# Patient Record
Sex: FEMALE | Race: WHITE | NOT HISPANIC OR LATINO | Employment: OTHER | URBAN - METROPOLITAN AREA
[De-identification: names, ages, dates, MRNs, and addresses within clinical notes are randomized per-mention and may not be internally consistent; named-entity substitution may affect disease eponyms.]

---

## 2017-01-13 DIAGNOSIS — F98.8 ADD (ATTENTION DEFICIT DISORDER): ICD-10-CM

## 2017-01-13 DIAGNOSIS — E78.5 HYPERLIPIDEMIA, UNSPECIFIED HYPERLIPIDEMIA TYPE: ICD-10-CM

## 2017-01-13 RX ORDER — ATORVASTATIN CALCIUM 10 MG/1
10 TABLET, FILM COATED ORAL DAILY
Qty: 30 TABLET | Refills: 2 | Status: SHIPPED | OUTPATIENT
Start: 2017-01-13 | End: 2017-05-30 | Stop reason: SDUPTHER

## 2017-01-13 RX ORDER — ESCITALOPRAM OXALATE 10 MG/1
10 TABLET ORAL DAILY
Qty: 30 TABLET | Refills: 2 | Status: SHIPPED | OUTPATIENT
Start: 2017-01-13 | End: 2017-05-30 | Stop reason: SDUPTHER

## 2017-01-13 RX ORDER — LISDEXAMFETAMINE DIMESYLATE CAPSULES 20 MG/1
20 CAPSULE ORAL EVERY MORNING
Qty: 30 CAPSULE | Refills: 0 | Status: SHIPPED | OUTPATIENT
Start: 2017-01-13 | End: 2017-02-14 | Stop reason: SDUPTHER

## 2017-01-13 NOTE — TELEPHONE ENCOUNTER
----- Message from Yulisa Chiang sent at 2017 12:28 PM CST -----  Contact: SELF  Renetta East  MRN: [id number]  : 1965  PCP: Nathen Arreola  Home Phone      [phone]  Work Phone      Not on file.  Mobile          Not on file.      MESSAGE: PT WAS SUPPOSED TO SEE DR GODFREY TODAY FOR 3 MO CHECKUP AND REFILLS. DR GODFREY HAD TO LEAVE AND SHE WILL NEED MEDS CALLED IN IF AT ALL POSSIBLE. NEEDS VYVANSE, LIPITOR, AND LEXAPRO    Phone: [phone]    PHARMACY:[facility]

## 2017-02-01 ENCOUNTER — TELEPHONE (OUTPATIENT)
Dept: FAMILY MEDICINE | Facility: CLINIC | Age: 52
End: 2017-02-01

## 2017-02-01 NOTE — TELEPHONE ENCOUNTER
Attempted to do PA for Vyvanse on covermymeds. Patient inactive. Attempted to call pt to find out what insurance she has. No answer,FAMILIA    Key: [id number]

## 2017-02-08 ENCOUNTER — TELEPHONE (OUTPATIENT)
Dept: FAMILY MEDICINE | Facility: CLINIC | Age: 52
End: 2017-02-08

## 2017-02-10 ENCOUNTER — TELEPHONE (OUTPATIENT)
Dept: FAMILY MEDICINE | Facility: CLINIC | Age: 52
End: 2017-02-10

## 2017-02-10 NOTE — TELEPHONE ENCOUNTER
----- Message from Gill Beltran sent at 2/10/2017  4:28 PM CST -----  Contact: self  Renetta East  MRN: [id number]  : 1965  PCP: Nathen Arreola  Home Phone      [phone]  Work Phone      Not on file.  Mobile          Not on file.      MESSAGE:    New pharmacy # for pa is [id number] through cover my meds for vyvanse 20 mg once a day    Phone:   [phone]    Pharmacy:   [facility] in [place]

## 2017-02-14 ENCOUNTER — OFFICE VISIT (OUTPATIENT)
Dept: FAMILY MEDICINE | Facility: CLINIC | Age: 52
End: 2017-02-14
Payer: COMMERCIAL

## 2017-02-14 ENCOUNTER — TELEPHONE (OUTPATIENT)
Dept: FAMILY MEDICINE | Facility: CLINIC | Age: 52
End: 2017-02-14

## 2017-02-14 VITALS
BODY MASS INDEX: 27.3 KG/M2 | WEIGHT: 148.38 LBS | HEIGHT: 62 IN | HEART RATE: 88 BPM | DIASTOLIC BLOOD PRESSURE: 72 MMHG | SYSTOLIC BLOOD PRESSURE: 128 MMHG

## 2017-02-14 DIAGNOSIS — F98.8 ADD (ATTENTION DEFICIT DISORDER): ICD-10-CM

## 2017-02-14 PROCEDURE — 99214 OFFICE O/P EST MOD 30 MIN: CPT | Mod: S$GLB,,, | Performed by: FAMILY MEDICINE

## 2017-02-14 PROCEDURE — 99999 PR PBB SHADOW E&M-EST. PATIENT-LVL II: CPT | Mod: PBBFAC,,, | Performed by: FAMILY MEDICINE

## 2017-02-14 RX ORDER — LISDEXAMFETAMINE DIMESYLATE CAPSULES 20 MG/1
20 CAPSULE ORAL EVERY MORNING
Qty: 30 CAPSULE | Refills: 0 | Status: SHIPPED | OUTPATIENT
Start: 2017-02-14 | End: 2017-05-30 | Stop reason: DRUGHIGH

## 2017-02-14 RX ORDER — LISDEXAMFETAMINE DIMESYLATE CAPSULES 20 MG/1
20 CAPSULE ORAL EVERY MORNING
Qty: 30 CAPSULE | Refills: 0 | Status: SHIPPED | OUTPATIENT
Start: 2017-04-12 | End: 2017-05-30 | Stop reason: SDUPTHER

## 2017-02-14 RX ORDER — LISDEXAMFETAMINE DIMESYLATE CAPSULES 20 MG/1
20 CAPSULE ORAL EVERY MORNING
Qty: 30 CAPSULE | Refills: 0 | Status: SHIPPED | OUTPATIENT
Start: 2017-03-13 | End: 2017-05-30 | Stop reason: DRUGHIGH

## 2017-02-14 NOTE — PROGRESS NOTES
Pt is a 51 y.o. female who presents for check up for   Encounter Diagnosis   Name Primary?    ADD (attention deficit disorder)    . Doing well on current meds. Denies any side effects. Prevention is up to date.    Chief complaint: Follow up for ADD    History of present illness: Renetta East is a 51 y.o. female here for followup on Vyvanse 20 mg once daily.  Patient states to be doing well.  The medication is helping.  The patient is able to pay attention and focus on tasks.  The patient denies side effects such as abdominal pain, headaches, insomnia, and poor appetite.  The patient is able to focus without getting distracted.  Work performance and grades are stable.  The work is helping the patient with modifications.  Staff is not complaining.  She is working as a nurse practitioner in the psychiatric department.  Patient is taking her statin every day for hyperlipidemia.  She is feeling well on the medication.  She denies side effects such as myalgias.  The only side effect is delayed orgasm.    Review of systems:  Gen.: No weight loss  HEENT: No headaches, sinus congestion, change in vision  Cardiovascular: No chest pain, palpitations  Respiratory: No cough, shortness of breath  Neurological: Sleeping well, no weakness, no syncope, normal memory, no seizure, no ticks    Physical exam: Vital signs-see nurse's notes  Gen.: Well developed, well nourished white female in no apparent distress  HEENT: Pupils equal round reactive to light and accommodation, extraocular muscles intact, TMs are normal translucent mobile, neck is supple no lymphadenopathy no JVD, throat is clear.  Heart:Cardiac exam revealed the PMI to be normally situated and sized. The rhythm was regular and no extrasystoles were noted during several minutes of auscultation. The first and second heart sounds were normal and physiologic splitting of the second heart sound was noted. There were no murmurs, rubs, clicks, or gallops.  Lungs:Lungs were  clear to auscultation and percussion, and with normal diaphragmatic excursion. No wheezes or rales were noted.   Neuro: Neurologically, the patient was awake, alert, and oriented to person, place and time. There were no obvious focal neurologic abnormalities.  Face: Erythematous rash in the nasal fold.  This appears to be rosacea versus staph.    Lab Results   Component Value Date    LDLCALC 192.8 (H) 07/22/2016     Assessment/Plan:    Attention deficit hyperactivity disorder - stable on current medication  The current medication Vyvanse 20 mg will be continued.  A prescription for 30 tablets, 3 prescriptions, was given.  I will continue to monitor her symptoms and adjust accordingly.    15 minute discussion with the family regarding the importance of proper sleep hygiene was completed.  The child will be encouraged to go to bed at the same time and wake up at the same time even on the weekends.  The family was encouraged to continue giving the medication even on weekends and holidays.    Mixed hyperlipidemia  Low fat diet.  Avoid sweets.  A 10 pound weight loss by the next visit as a  good goal.  Increase consumption of fruits and vegetables, fish and chicken.  Use medications below:  Continue Lipitor 10 mg daily  When she is taking her Lipitor regularly for 2 months, we will obtain a lipid panel.    ADD (attention deficit disorder)  -     lisdexamfetamine (VYVANSE) 20 MG capsule; Take 1 capsule (20 mg total) by mouth every morning.  -     lisdexamfetamine (VYVANSE) 20 MG capsule; Take 1 capsule (20 mg total) by mouth every morning.  -     lisdexamfetamine (VYVANSE) 20 MG capsule; Take 1 capsule (20 mg total) by mouth every morning.    Calculus of gallbladder without cholecystitis without obstruction  No treatment necessary at this time    The next appointment will be 3 months.

## 2017-02-14 NOTE — MR AVS SNAPSHOT
[place] [id number] [email or web address]  [facility] [address]  Phone: [phone]  Fax: [phone]                  Renetta East   2017 9:45 AM   Office Visit    Description:  Female : 1965   Provider:  Nathen Arreola MD   Department:  [facility]           Reason for Visit     Follow-up           Diagnoses this Visit        Comments    ADD (attention deficit disorder)                To Do List           Future Appointments        Provider Department Dept Phone    2017 8:00 AM Nathen Arreola MD [facility] [phone]      Goals (5 Years of Data)     None      Follow-Up and Disposition     Return in about 6 months (around 2017).       These Medications        Disp Refills Start End    lisdexamfetamine (VYVANSE) 20 MG capsule 30 capsule 0 2017     Take 1 capsule (20 mg total) by mouth every morning. - Oral    Pharmacy: [id number] - [employer], [place] - [address] AT [address] of [facility] Ph #: [phone]       lisdexamfetamine (VYVANSE) 20 MG capsule 30 capsule 0 3/13/2017     Take 1 capsule (20 mg total) by mouth every morning. - Oral    Pharmacy: [email or web address] [id number] - [employer], [place] - [address] AT [address] of [facility] Ph #: [phone]       lisdexamfetamine (VYVANSE) 20 MG capsule 30 capsule 0 2017     Take 1 capsule (20 mg total) by mouth every morning. - Oral    Pharmacy: [id number] - [employer], [place] - [address] AT [address] of [facility] Ph #: [phone]         [facility]     [facility] -  Assistance  Registered nurses in the [facility] provide clinical advisement, health education, appointment booking, and other advisory services.  Call for this free service at [phone].             Medications           Message regarding Medications     Verify the changes and/or additions to your  "medication regime listed below are the same as discussed with your clinician today.  If any of these changes or additions are incorrect, please notify your healthcare provider.             Verify that the below list of medications is an accurate representation of the medications you are currently taking.  If none reported, the list may be blank. If incorrect, please contact your healthcare provider. Carry this list with you in case of emergency.           Current Medications     atorvastatin (LIPITOR) 10 MG tablet Take 1 tablet (10 mg total) by mouth once daily.    escitalopram oxalate (LEXAPRO) 10 MG tablet Take 1 tablet (10 mg total) by mouth once daily.    lisdexamfetamine (VYVANSE) 20 MG capsule Starting on Apr 12, 2017. Take 1 capsule (20 mg total) by mouth every morning.    lisdexamfetamine (VYVANSE) 20 MG capsule Starting on Mar 13, 2017. Take 1 capsule (20 mg total) by mouth every morning.    lisdexamfetamine (VYVANSE) 20 MG capsule Take 1 capsule (20 mg total) by mouth every morning.    ESTRACE 0.01 % (0.1 mg/gram) vaginal cream Place 1 g vaginally twice a week.           Clinical Reference Information           Your Vitals Were     BP Pulse Height Weight BMI    128/72 (BP Location: Left arm, Patient Position: Sitting, BP Method: Manual) 88 5' 2" (1.575 m) 67.3 kg (148 lb 5.9 oz) 27.14 kg/m2      Blood Pressure          Most Recent Value    BP  128/72      Allergies as of 2/14/2017     Penicillins      Immunizations Administered on Date of Encounter - 2/14/2017     None      Language Assistance Services     ATTENTION: Language assistance services are available, free of charge. Please call [phone].      ATENCIÓN: Si habla español, tiene a walters disposición servicios gratuitos de asistencia lingüística. Llame al [phone].     CHÚ Ý: N?u b?n nói Ti?ng Vi?t, có các d?ch v? h? tr? ngôn ng? mi?n phí dành cho b?n. G?i s? [phone].         [facility] complies with applicable Federal " civil rights laws and does not discriminate on the basis of race, color, national origin, age, disability, or sex.

## 2017-02-14 NOTE — TELEPHONE ENCOUNTER
Key: [id number]  Pending approval    The plan will send a determination in CoverMyMeds and via fax, typically within 1 to 5 business days

## 2017-02-15 ENCOUNTER — TELEPHONE (OUTPATIENT)
Dept: FAMILY MEDICINE | Facility: CLINIC | Age: 52
End: 2017-02-15

## 2017-02-15 NOTE — TELEPHONE ENCOUNTER
----- Message from Summer Sea sent at 2/15/2017 10:42 AM CST -----  Contact: aida Tiesha East  MRN: [id number]  : 1965  PCP: Nathen Arreola  Home Phone      [phone]  Work Phone      Not on file.  Mobile          Not on file.      MESSAGE: needs pa on pts v[employer]axInnate    Pharmacy Phone: [phone]

## 2017-02-16 ENCOUNTER — TELEPHONE (OUTPATIENT)
Dept: FAMILY MEDICINE | Facility: CLINIC | Age: 52
End: 2017-02-16

## 2017-02-16 NOTE — TELEPHONE ENCOUNTER
----- Message from Clyde Gomez sent at 2017  3:00 PM CST -----  Contact: Patient  Renetta East  MRN: [id number]  : 1965  PCP: Nathen Arreola  Home Phone      [phone]  Work Phone      Not on file.  Mobile          Not on file.      MESSAGE: has been waiting on PA for Vyvanse since 17 -- states it does not go thru cover my meds -- Now [employer] & we need to call -- Phone # [phone]  - Patient's pharmacy ID  [id number] -- please try to obtain ASAP     call patient @ [phone]    PCP: [place]

## 2017-02-16 NOTE — TELEPHONE ENCOUNTER
Called [employer] [email or web address] and spoke to Lynn with PA team. We completed PA by phone for Vyvanse 20mg tablets and Pa is pending pharmacy review. Decision will be made within 24 to 72 business hours.     Pt notified about PA status and thanked me for calling.

## 2017-02-18 ENCOUNTER — TELEPHONE (OUTPATIENT)
Dept: FAMILY MEDICINE | Facility: CLINIC | Age: 52
End: 2017-02-18

## 2017-05-16 ENCOUNTER — PATIENT OUTREACH (OUTPATIENT)
Dept: ADMINISTRATIVE | Facility: HOSPITAL | Age: 52
End: 2017-05-16

## 2017-05-16 NOTE — LETTER
May 25, 2017    Renetta East  [address]  [place] [address]             [facility]  [address]  [facility] [address]  Phone: [phone] Dear Ms. East:    We have tried to reach you by [employer] unsuccessfully.      This message is to inform you that the patient has not yet read the following message. (Notification date: May 23, 2017)   Pre visit chart check     From   Yanely Brunson LPN    To   Renetta East    Sent   5/16/2017  8:40 AM      [facility] is committed to your overall health.  To help you get the most out of each of your visits, we will review your information to make sure you are up to date on all of your recommended tests and/or procedures.       Dr. Arreola has found that you may be due for a tetanus vaccine, a Hepatitis C screening, and a mammogram.     If you have had any of the above done at another facility, please bring the records or information with you so that your record at [facility] will be complete.  If you would like to schedule any of these, please contact me.     If you are currently taking medication, please bring it with you to your appointment for review.     Sincerely,       Yanely Brunson LPN Clinical Care Coordinator   [facility]   [phone]    [facility]      Patient Portal User Last Read On   Renetta East Not Read          If you have any questions or concerns, please don't hesitate to call.    Sincerely,        Yanely Brunson LPN

## 2017-05-16 NOTE — LETTER
May 25, 2017    Renetta East  [address]  [place] [address]             [facility]  [address] [place] [address] [address]  [facility] [address]  Phone: [phone] Dear Ms. East:    We have tried to reach you by mychart unsuccessfully.    [facility] is committed to your overall health.  To help you get the most out of each of your visits, we will review your information to make sure you are up to date on all of your recommended tests and/or procedures.       Dr. Arreola has found that you may be due for a tetanus vaccine, a Hepatitis C screening, and a mammogram.     If you have had any of the above done at another facility, please bring the records or information with you so that your record at [facility] will be complete.  If you would like to schedule any of these, please contact me.     If you are currently taking medication, please bring it with you to your appointment for review.     Sincerely,       Yanely Brunson LPN Clinical Care Coordinator   [facility]   [phone]

## 2017-05-30 ENCOUNTER — OFFICE VISIT (OUTPATIENT)
Dept: FAMILY MEDICINE | Facility: CLINIC | Age: 52
End: 2017-05-30
Payer: COMMERCIAL

## 2017-05-30 VITALS
HEART RATE: 68 BPM | WEIGHT: 149 LBS | HEIGHT: 62 IN | SYSTOLIC BLOOD PRESSURE: 112 MMHG | BODY MASS INDEX: 27.42 KG/M2 | DIASTOLIC BLOOD PRESSURE: 74 MMHG | RESPIRATION RATE: 15 BRPM

## 2017-05-30 DIAGNOSIS — F98.8 ADD (ATTENTION DEFICIT DISORDER): ICD-10-CM

## 2017-05-30 DIAGNOSIS — E78.5 HYPERLIPIDEMIA, UNSPECIFIED HYPERLIPIDEMIA TYPE: ICD-10-CM

## 2017-05-30 LAB
ALT SERPL W/O P-5'-P-CCNC: 25 U/L
ANION GAP SERPL CALC-SCNC: 8 MMOL/L
BUN SERPL-MCNC: 15 MG/DL
CALCIUM SERPL-MCNC: 9.7 MG/DL
CHLORIDE SERPL-SCNC: 104 MMOL/L
CHOLEST/HDLC SERPL: 4.2 {RATIO}
CO2 SERPL-SCNC: 29 MMOL/L
CREAT SERPL-MCNC: 0.8 MG/DL
EST. GFR  (AFRICAN AMERICAN): >60 ML/MIN/1.73 M^2
EST. GFR  (NON AFRICAN AMERICAN): >60 ML/MIN/1.73 M^2
GLUCOSE SERPL-MCNC: 88 MG/DL
HDL/CHOLESTEROL RATIO: 23.9 %
HDLC SERPL-MCNC: 251 MG/DL
HDLC SERPL-MCNC: 60 MG/DL
LDLC SERPL CALC-MCNC: 171.6 MG/DL
NONHDLC SERPL-MCNC: 191 MG/DL
POTASSIUM SERPL-SCNC: 4.3 MMOL/L
SODIUM SERPL-SCNC: 141 MMOL/L
TRIGL SERPL-MCNC: 97 MG/DL

## 2017-05-30 PROCEDURE — 99214 OFFICE O/P EST MOD 30 MIN: CPT | Mod: S$GLB,,, | Performed by: FAMILY MEDICINE

## 2017-05-30 PROCEDURE — 99999 PR PBB SHADOW E&M-EST. PATIENT-LVL III: CPT | Mod: PBBFAC,,, | Performed by: FAMILY MEDICINE

## 2017-05-30 PROCEDURE — 36415 COLL VENOUS BLD VENIPUNCTURE: CPT | Mod: S$GLB,,, | Performed by: FAMILY MEDICINE

## 2017-05-30 PROCEDURE — 80048 BASIC METABOLIC PNL TOTAL CA: CPT

## 2017-05-30 PROCEDURE — 80061 LIPID PANEL: CPT

## 2017-05-30 PROCEDURE — 84460 ALANINE AMINO (ALT) (SGPT): CPT

## 2017-05-30 RX ORDER — LISDEXAMFETAMINE DIMESYLATE 30 MG/1
30 CAPSULE ORAL EVERY MORNING
Qty: 30 CAPSULE | Refills: 0 | Status: SHIPPED | OUTPATIENT
Start: 2017-06-02 | End: 2017-09-29 | Stop reason: SDUPTHER

## 2017-05-30 RX ORDER — CONJUGATED ESTROGENS/BAZEDOXIFENE .45; 2 MG/1; MG/1
TABLET, FILM COATED ORAL
Refills: 11 | COMMUNITY
Start: 2017-04-11 | End: 2018-06-09 | Stop reason: SDUPTHER

## 2017-05-30 RX ORDER — LISDEXAMFETAMINE DIMESYLATE 30 MG/1
30 CAPSULE ORAL EVERY MORNING
Qty: 30 CAPSULE | Refills: 0 | Status: SHIPPED | OUTPATIENT
Start: 2017-07-29 | End: 2017-09-29 | Stop reason: SDUPTHER

## 2017-05-30 RX ORDER — LISDEXAMFETAMINE DIMESYLATE 30 MG/1
30 CAPSULE ORAL EVERY MORNING
Qty: 30 CAPSULE | Refills: 0 | Status: SHIPPED | OUTPATIENT
Start: 2017-07-01 | End: 2017-09-29 | Stop reason: SDUPTHER

## 2017-05-30 RX ORDER — ESCITALOPRAM OXALATE 10 MG/1
10 TABLET ORAL DAILY
Qty: 30 TABLET | Refills: 2 | Status: SHIPPED | OUTPATIENT
Start: 2017-05-30 | End: 2017-09-29 | Stop reason: SDUPTHER

## 2017-05-30 RX ORDER — ATORVASTATIN CALCIUM 10 MG/1
10 TABLET, FILM COATED ORAL DAILY
Qty: 30 TABLET | Refills: 5 | Status: SHIPPED | OUTPATIENT
Start: 2017-05-30 | End: 2017-12-12

## 2017-05-30 NOTE — PROGRESS NOTES
Pt is a 51 y.o. female who presents for check up for   Encounter Diagnoses   Name Primary?    ADD (attention deficit disorder)     Hyperlipidemia, unspecified hyperlipidemia type    . Doing well on current meds. Denies any side effects. Prevention is up to date.    Chief complaint: Follow up for ADD    History of present illness: Renetta East is a 51 y.o. female here for followup on Vyvanse 20 mg once daily.  Patient dose of the medication is not working as well as it used to.  The patient is able to pay attention and focus on tasks.  The patient denies side effects such as abdominal pain, headaches, insomnia, and poor appetite.  The patient is able to focus without getting distracted.  Work performance and grades are stable.  The work is helping the patient with modifications.  Staff is not complaining.  She is working as a nurse practitioner in the psychiatric department.  Patient is not taking her statin every day for hyperlipidemia.  She is feeling well on the medication.  She denies side effects such as myalgias.  The only side effect is delayed orgasm.    Review of systems:  Gen.: No weight loss  HEENT: No headaches, sinus congestion, change in vision  Cardiovascular: No chest pain, palpitations  Respiratory: No cough, shortness of breath  Neurological: Sleeping well, no weakness, no syncope, normal memory, no seizure, no ticks    Physical exam:   Vitals:    05/30/17 0808   BP: 112/74   Pulse: 68   Resp: 15     Gen.: Well developed, well nourished white female in no apparent distress  HEENT: Pupils equal round reactive to light and accommodation, extraocular muscles intact, TMs are normal translucent mobile, neck is supple no lymphadenopathy no JVD, throat is clear.  Heart:Cardiac exam revealed the PMI to be normally situated and sized. The rhythm was regular and no extrasystoles were noted during several minutes of auscultation. The first and second heart sounds were normal and physiologic splitting of  the second heart sound was noted. There were no murmurs, rubs, clicks, or gallops.  Lungs:Lungs were clear to auscultation and percussion, and with normal diaphragmatic excursion. No wheezes or rales were noted.   Neuro: Neurologically, the patient was awake, alert, and oriented to person, place and time. There were no obvious focal neurologic abnormalities.  Face: Erythematous rash in the nasal fold.  This appears to be rosacea versus staph.    Lab Results   Component Value Date    LDLCALC 192.8 (H) 07/22/2016     Assessment/Plan:    Attention deficit hyperactivity disorder - stable on current medication  The current medication Vyvanse 20 mg will be continued.  A prescription for 30 tablets, 3 prescriptions, was given.  I will continue to monitor her symptoms and adjust accordingly.    15 minute discussion with the family regarding the importance of proper sleep hygiene was completed.  The child will be encouraged to go to bed at the same time and wake up at the same time even on the weekends.  The family was encouraged to continue giving the medication even on weekends and holidays.    Mixed hyperlipidemia  Low fat diet.  Avoid sweets.  A 10 pound weight loss by the next visit as a  good goal.  Increase consumption of fruits and vegetables, fish and chicken.  Use medications below:  Continue Lipitor 10 mg daily  When she is taking her Lipitor regularly for 2 months, we will obtain a lipid panel.    ADD (attention deficit disorder)  -     lisdexamfetamine (VYVANSE) 30 MG capsule; Take 1 capsule (20 mg total) by mouth every morning.  -     lisdexamfetamine (VYVANSE) 30 MG capsule; Take 1 capsule (20 mg total) by mouth every morning.  -     lisdexamfetamine (VYVANSE) 30 MG capsule; Take 1 capsule (20 mg total) by mouth every morning.    Calculus of gallbladder without cholecystitis without obstruction  No treatment necessary at this time    ADD (attention deficit disorder)  -     lisdexamfetamine (VYVANSE) 30 MG  capsule; Take 1 capsule (30 mg total) by mouth every morning.  Dispense: 30 capsule; Refill: 0  -     lisdexamfetamine (VYVANSE) 30 MG capsule; Take 1 capsule (30 mg total) by mouth every morning.  Dispense: 30 capsule; Refill: 0  -     lisdexamfetamine (VYVANSE) 30 MG capsule; Take 1 capsule (30 mg total) by mouth every morning.  Dispense: 30 capsule; Refill: 0    Hyperlipidemia, unspecified hyperlipidemia type  -     atorvastatin (LIPITOR) 10 MG tablet; Take 1 tablet (10 mg total) by mouth once daily.  Dispense: 30 tablet; Refill: 5  -     ALT (SGPT); Future  -     Basic metabolic panel; Future  -     Lipid panel; Future    Other orders  -     escitalopram oxalate (LEXAPRO) 10 MG tablet; Take 1 tablet (10 mg total) by mouth once daily.  Dispense: 30 tablet; Refill: 2        The next appointment will be 3 months.

## 2017-05-30 NOTE — PROGRESS NOTES
Tell patient that labs show mildly improved cholesterol but it could be better.  Increase Lipitor to 10 mg once daily.  Take a whole pill once daily.

## 2017-09-29 ENCOUNTER — OFFICE VISIT (OUTPATIENT)
Dept: FAMILY MEDICINE | Facility: CLINIC | Age: 52
End: 2017-09-29
Payer: COMMERCIAL

## 2017-09-29 VITALS
WEIGHT: 148.38 LBS | RESPIRATION RATE: 20 BRPM | HEART RATE: 72 BPM | DIASTOLIC BLOOD PRESSURE: 68 MMHG | SYSTOLIC BLOOD PRESSURE: 108 MMHG | BODY MASS INDEX: 27.3 KG/M2 | HEIGHT: 62 IN

## 2017-09-29 DIAGNOSIS — M77.8 SHOULDER TENDINITIS, LEFT: ICD-10-CM

## 2017-09-29 DIAGNOSIS — F98.8 ATTENTION DEFICIT DISORDER, UNSPECIFIED HYPERACTIVITY PRESENCE: ICD-10-CM

## 2017-09-29 DIAGNOSIS — F32.A DEPRESSION, UNSPECIFIED DEPRESSION TYPE: Primary | ICD-10-CM

## 2017-09-29 PROCEDURE — 3008F BODY MASS INDEX DOCD: CPT | Mod: S$GLB,,, | Performed by: FAMILY MEDICINE

## 2017-09-29 PROCEDURE — 99999 PR PBB SHADOW E&M-EST. PATIENT-LVL III: CPT | Mod: PBBFAC,,, | Performed by: FAMILY MEDICINE

## 2017-09-29 PROCEDURE — 99214 OFFICE O/P EST MOD 30 MIN: CPT | Mod: S$GLB,,, | Performed by: FAMILY MEDICINE

## 2017-09-29 RX ORDER — LISDEXAMFETAMINE DIMESYLATE 30 MG/1
30 CAPSULE ORAL EVERY MORNING
Qty: 30 CAPSULE | Refills: 0 | Status: SHIPPED | OUTPATIENT
Start: 2017-09-29 | End: 2017-12-12 | Stop reason: SDUPTHER

## 2017-09-29 RX ORDER — LISDEXAMFETAMINE DIMESYLATE 30 MG/1
30 CAPSULE ORAL EVERY MORNING
Qty: 30 CAPSULE | Refills: 0 | Status: SHIPPED | OUTPATIENT
Start: 2017-11-25 | End: 2017-12-12 | Stop reason: SDUPTHER

## 2017-09-29 RX ORDER — LISDEXAMFETAMINE DIMESYLATE 30 MG/1
30 CAPSULE ORAL EVERY MORNING
Qty: 30 CAPSULE | Refills: 0 | Status: SHIPPED | OUTPATIENT
Start: 2017-10-28 | End: 2017-12-12 | Stop reason: SDUPTHER

## 2017-09-29 RX ORDER — ESCITALOPRAM OXALATE 10 MG/1
10 TABLET ORAL DAILY
Qty: 30 TABLET | Refills: 5 | Status: SHIPPED | OUTPATIENT
Start: 2017-09-29 | End: 2017-12-12 | Stop reason: SDUPTHER

## 2017-09-29 NOTE — PROGRESS NOTES
Pt is a 52 y.o. female who presents for check up for   Encounter Diagnoses   Name Primary?    Attention deficit disorder, unspecified hyperactivity presence     Depression, unspecified depression type Yes    Shoulder tendinitis, left    . Doing well on current meds. Denies any side effects. Prevention is up to date.    Chief complaint: Follow up for ADD    History of present illness: Renetta East is a 52 y.o. female here for followup on Vyvanse 30 mg once daily.  Patient dose of the medication is not working as well as it used to.  The patient is able to pay attention and focus on tasks.  The patient denies side effects such as abdominal pain, headaches, insomnia, and poor appetite.  The patient is able to focus without getting distracted.  Work performance and grades are stable.  The work is helping the patient with modifications.  Staff is not complaining.  She is working as a nurse practitioner in the psychiatric department.  Patient is not taking her statin every day for hyperlipidemia.  She is feeling well on the medication.  She denies side effects such as myalgias.  The only side effect is delayed orgasm.    Review of systems:  Gen.: No weight loss  HEENT: No headaches, sinus congestion, change in vision  Cardiovascular: No chest pain, palpitations  Respiratory: No cough, shortness of breath  Neurological: Sleeping well, no weakness, no syncope, normal memory, no seizure, no ticks    Physical exam:   Vitals:    09/29/17 1241   BP: 108/68   Pulse: 72   Resp: 20     Gen.: Well developed, well nourished white female in no apparent distress  HEENT: Pupils equal round reactive to light and accommodation, extraocular muscles intact, TMs are normal translucent mobile, neck is supple no lymphadenopathy no JVD, throat is clear.  Heart:Cardiac exam revealed the PMI to be normally situated and sized. The rhythm was regular and no extrasystoles were noted during several minutes of auscultation. The first and second  heart sounds were normal and physiologic splitting of the second heart sound was noted. There were no murmurs, rubs, clicks, or gallops.  Lungs:Lungs were clear to auscultation and percussion, and with normal diaphragmatic excursion. No wheezes or rales were noted.   Neuro: Neurologically, the patient was awake, alert, and oriented to person, place and time. There were no obvious focal neurologic abnormalities.  Face: Erythematous rash in the nasal fold.  This appears to be rosacea versus staph.    Lab Results   Component Value Date    LDLCALC 171.6 (H) 05/30/2017     Assessment/Plan:    Attention deficit hyperactivity disorder - stable on current medication  The current medication Vyvanse 20 mg will be continued.  A prescription for 30 tablets, 3 prescriptions, was given.  I will continue to monitor her symptoms and adjust accordingly.    15 minute discussion with the family regarding the importance of proper sleep hygiene was completed.  The child will be encouraged to go to bed at the same time and wake up at the same time even on the weekends.  The family was encouraged to continue giving the medication even on weekends and holidays.    Mixed hyperlipidemia  Low fat diet.  Avoid sweets.  A 10 pound weight loss by the next visit as a  good goal.  Increase consumption of fruits and vegetables, fish and chicken.  Use medications below:  Continue Lipitor 10 mg daily  When she is taking her Lipitor regularly for 2 months, we will obtain a lipid panel.    ADD (attention deficit disorder)  -     lisdexamfetamine (VYVANSE) 30 MG capsule; Take 1 capsule (20 mg total) by mouth every morning.  -     lisdexamfetamine (VYVANSE) 30 MG capsule; Take 1 capsule (20 mg total) by mouth every morning.  -     lisdexamfetamine (VYVANSE) 30 MG capsule; Take 1 capsule (20 mg total) by mouth every morning.    Calculus of gallbladder without cholecystitis without obstruction  No treatment necessary at this time    Depression, unspecified  depression type  -     escitalopram oxalate (LEXAPRO) 10 MG tablet; Take 1 tablet (10 mg total) by mouth once daily.  Dispense: 30 tablet; Refill: 5    Attention deficit disorder, unspecified hyperactivity presence  -     lisdexamfetamine (VYVANSE) 30 MG capsule; Take 1 capsule (30 mg total) by mouth every morning.  Dispense: 30 capsule; Refill: 0  -     lisdexamfetamine (VYVANSE) 30 MG capsule; Take 1 capsule (30 mg total) by mouth every morning.  Dispense: 30 capsule; Refill: 0  -     lisdexamfetamine (VYVANSE) 30 MG capsule; Take 1 capsule (30 mg total) by mouth every morning.  Dispense: 30 capsule; Refill: 0    Shoulder tendinitis, left    shoulder exercises    The next appointment will be 3 months.

## 2017-12-12 ENCOUNTER — OFFICE VISIT (OUTPATIENT)
Dept: FAMILY MEDICINE | Facility: CLINIC | Age: 52
End: 2017-12-12
Payer: COMMERCIAL

## 2017-12-12 VITALS
RESPIRATION RATE: 18 BRPM | HEIGHT: 62 IN | WEIGHT: 149.38 LBS | SYSTOLIC BLOOD PRESSURE: 118 MMHG | HEART RATE: 76 BPM | BODY MASS INDEX: 27.49 KG/M2 | DIASTOLIC BLOOD PRESSURE: 68 MMHG

## 2017-12-12 DIAGNOSIS — F32.A DEPRESSION, UNSPECIFIED DEPRESSION TYPE: ICD-10-CM

## 2017-12-12 DIAGNOSIS — F98.8 ATTENTION DEFICIT DISORDER, UNSPECIFIED HYPERACTIVITY PRESENCE: ICD-10-CM

## 2017-12-12 PROCEDURE — 99213 OFFICE O/P EST LOW 20 MIN: CPT | Mod: S$GLB,,, | Performed by: FAMILY MEDICINE

## 2017-12-12 PROCEDURE — 99999 PR PBB SHADOW E&M-EST. PATIENT-LVL III: CPT | Mod: PBBFAC,,, | Performed by: FAMILY MEDICINE

## 2017-12-12 RX ORDER — ESCITALOPRAM OXALATE 10 MG/1
10 TABLET ORAL DAILY
Qty: 30 TABLET | Refills: 5 | Status: SHIPPED | OUTPATIENT
Start: 2017-12-12 | End: 2018-04-10 | Stop reason: SDUPTHER

## 2017-12-12 RX ORDER — LISDEXAMFETAMINE DIMESYLATE 30 MG/1
30 CAPSULE ORAL EVERY MORNING
Qty: 30 CAPSULE | Refills: 0 | Status: SHIPPED | OUTPATIENT
Start: 2018-01-11 | End: 2017-12-12 | Stop reason: SDUPTHER

## 2017-12-12 RX ORDER — LISDEXAMFETAMINE DIMESYLATE 30 MG/1
30 CAPSULE ORAL EVERY MORNING
Qty: 30 CAPSULE | Refills: 0 | Status: SHIPPED | OUTPATIENT
Start: 2018-02-10 | End: 2017-12-12 | Stop reason: SDUPTHER

## 2017-12-12 RX ORDER — LISDEXAMFETAMINE DIMESYLATE 30 MG/1
30 CAPSULE ORAL EVERY MORNING
Qty: 30 CAPSULE | Refills: 0 | Status: SHIPPED | OUTPATIENT
Start: 2017-12-12 | End: 2017-12-12 | Stop reason: SDUPTHER

## 2017-12-12 NOTE — PROGRESS NOTES
Pt is a 52 y.o. female who presents for check up for   Encounter Diagnoses   Name Primary?    Attention deficit disorder, unspecified hyperactivity presence     Depression, unspecified depression type    . Doing well on current meds. Denies any side effects. Prevention is up to date.    Chief complaint: Follow up for ADD    History of present illness: Renetta East is a 52 y.o. female here for followup on Vyvanse 30 mg once daily.  The patient is able to pay attention and focus on tasks.  The patient denies side effects such as abdominal pain, headaches, insomnia, and poor appetite.  The patient is able to focus without getting distracted.  Work performance is stable.  The work is helping the patient with modifications.  Staff is not complaining.  She is working as a nurse practitioner in the psychiatric department.  Patient is doing better on Lexapro 10 mg daily.  Her mood is stable.  She notices a difference if she forgets to take it for 2 or 3 days.  Patient has dyslipidemia.  She is not taking her statin at this time    Review of systems:  Gen.: No weight loss  HEENT: No headaches, sinus congestion, change in vision  Cardiovascular: No chest pain, palpitations  Respiratory: No cough, shortness of breath  Neurological: Sleeping well, no weakness, no syncope, normal memory, no seizure, no ticks    Physical exam:   Vitals:    12/12/17 0905   BP: 118/68   Pulse: 76   Resp: 18     Gen.: Well developed, well nourished white female in no apparent distress  HEENT: Pupils equal round reactive to light and accommodation, extraocular muscles intact, TMs are normal translucent mobile, neck is supple no lymphadenopathy no JVD, throat is clear.  Heart:Cardiac exam revealed the PMI to be normally situated and sized. The rhythm was regular and no extrasystoles were noted during several minutes of auscultation. The first and second heart sounds were normal and physiologic splitting of the second heart sound was noted. There  were no murmurs, rubs, clicks, or gallops.  Lungs:Lungs were clear to auscultation and percussion, and with normal diaphragmatic excursion. No wheezes or rales were noted.   Neuro: Neurologically, the patient was awake, alert, and oriented to person, place and time. There were no obvious focal neurologic abnormalities.  Face: Erythematous rash in the nasal fold.  This appears to be rosacea versus staph.    Lab Results   Component Value Date    LDLCALC 171.6 (H) 05/30/2017     Assessment/Plan:    Attention deficit hyperactivity disorder - stable on current medication  The current medication Vyvanse 30 mg will be continued.  A prescription for 30 tablets, 3 prescriptions, was given.  I will continue to monitor her symptoms and adjust accordingly.    15 minute discussion with the family regarding the importance of proper sleep hygiene was completed.  The child will be encouraged to go to bed at the same time and wake up at the same time even on the weekends.  The family was encouraged to continue giving the medication even on weekends and holidays.    Mixed hyperlipidemia  Low fat diet.  Avoid sweets.  A 10 pound weight loss by the next visit as a  good goal.  Increase consumption of fruits and vegetables, fish and chicken.  Use medications below:  She is currently not taking Lipitor at this time.  Repeat lipid panel at her next visit.  Recommend fish oil    ADD (attention deficit disorder)  -     lisdexamfetamine (VYVANSE) 30 MG capsule; Take 1 capsule (20 mg total) by mouth every morning.  -     lisdexamfetamine (VYVANSE) 30 MG capsule; Take 1 capsule (20 mg total) by mouth every morning.  -     lisdexamfetamine (VYVANSE) 30 MG capsule; Take 1 capsule (20 mg total) by mouth every morning.    Calculus of gallbladder without cholecystitis without obstruction  No treatment necessary at this time    Attention deficit disorder, unspecified hyperactivity presence  -     lisdexamfetamine (VYVANSE) 30 MG capsule; Take 1  capsule (30 mg total) by mouth every morning.  Dispense: 30 capsule; Refill: 0  -     lisdexamfetamine (VYVANSE) 30 MG capsule; Take 1 capsule (30 mg total) by mouth every morning.  Dispense: 30 capsule; Refill: 0  -     lisdexamfetamine (VYVANSE) 30 MG capsule; Take 1 capsule (30 mg total) by mouth every morning.  Dispense: 30 capsule; Refill: 0    Depression, unspecified depression type  -     escitalopram oxalate (LEXAPRO) 10 MG tablet; Take 1 tablet (10 mg total) by mouth once daily.  Dispense: 30 tablet; Refill: 5      The next appointment will be 3 months.

## 2017-12-12 NOTE — TELEPHONE ENCOUNTER
----- Message from Jess Bueno sent at 2017 11:28 AM CST -----  Contact: [facility] in [place]  Renetta East  MRN: [id number]  : 1965  PCP: Nathen Arreola  Home Phone      [phone]  Work Phone      Not on file.  Mobile          Not on file.    MESSAGE: Needs to get a verbal on her RX escitalopram oxalate (LEXAPRO) 10 MG tablet. There was some issues with the escript system, so they are having to call on each escript that was affected. Please call.    Pharmacy: [facility] in [place]  Phone:  [phone]
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
## 2017-12-13 RX ORDER — LISDEXAMFETAMINE DIMESYLATE 30 MG/1
30 CAPSULE ORAL EVERY MORNING
Qty: 30 CAPSULE | Refills: 0 | Status: SHIPPED | OUTPATIENT
Start: 2017-12-13 | End: 2018-04-10 | Stop reason: SDUPTHER

## 2017-12-13 RX ORDER — LISDEXAMFETAMINE DIMESYLATE 30 MG/1
30 CAPSULE ORAL EVERY MORNING
Qty: 30 CAPSULE | Refills: 0 | Status: SHIPPED | OUTPATIENT
Start: 2018-01-11 | End: 2018-06-25 | Stop reason: SDUPTHER

## 2017-12-13 RX ORDER — LISDEXAMFETAMINE DIMESYLATE 30 MG/1
30 CAPSULE ORAL EVERY MORNING
Qty: 30 CAPSULE | Refills: 0 | Status: SHIPPED | OUTPATIENT
Start: 2018-02-10 | End: 2018-06-25 | Stop reason: SDUPTHER

## 2018-02-26 ENCOUNTER — TELEPHONE (OUTPATIENT)
Dept: FAMILY MEDICINE | Facility: CLINIC | Age: 53
End: 2018-02-26

## 2018-02-26 NOTE — TELEPHONE ENCOUNTER
----- Message from Leila Whyte sent at 2018  2:44 PM CST -----  Contact: Self  Renetta East  MRN: [id number]  : 1965  PCP: Nathen Arreola  Home Phone      [phone]  Work Phone      Not on file.  Mobile          Not on file.      MESSAGE:   Patient needs a Pa. Please advise  lisdexamfetamine (VYVANSE) 30 MG capsule    Ph[facility]acy [facility]

## 2018-02-27 ENCOUNTER — TELEPHONE (OUTPATIENT)
Dept: FAMILY MEDICINE | Facility: CLINIC | Age: 53
End: 2018-02-27

## 2018-02-27 NOTE — TELEPHONE ENCOUNTER
Received from micaela/chiquita HARRINGTON on vyvanse 30mg  #30/30 sent via [facility]      Key: [id number]

## 2018-02-28 NOTE — TELEPHONE ENCOUNTER
Received from [employer], approval for [id number] approved from 2/27/18 to 2/26/19    Approval # [id number]    Faxed approval to Ya

## 2018-04-10 DIAGNOSIS — F32.A DEPRESSION, UNSPECIFIED DEPRESSION TYPE: ICD-10-CM

## 2018-04-10 DIAGNOSIS — F98.8 ATTENTION DEFICIT DISORDER, UNSPECIFIED HYPERACTIVITY PRESENCE: ICD-10-CM

## 2018-04-10 NOTE — TELEPHONE ENCOUNTER
----- Message from Leila Whyte sent at 4/10/2018  1:37 PM CDT -----  Contact: Self  Renetta East  MRN: [id number]  : 1965  PCP: Nathen Arreola  Home Phone      [phone]  Work Phone      Not on file.  Mobile          Not on file.      MESSAGE:    Patient needs refill:  lisdexamfetamine (VYVANSE) 30 MG capsule  escitalopram oxalate (LEXAPRO) 10 MG tablet    Pharmacy:  [facility]

## 2018-04-11 RX ORDER — LISDEXAMFETAMINE DIMESYLATE 30 MG/1
30 CAPSULE ORAL EVERY MORNING
Qty: 30 CAPSULE | Refills: 0 | Status: SHIPPED | OUTPATIENT
Start: 2018-04-11 | End: 2018-05-14 | Stop reason: SDUPTHER

## 2018-04-11 RX ORDER — ESCITALOPRAM OXALATE 10 MG/1
10 TABLET ORAL DAILY
Qty: 30 TABLET | Refills: 5 | Status: SHIPPED | OUTPATIENT
Start: 2018-04-11 | End: 2018-05-14 | Stop reason: SDUPTHER

## 2018-05-14 DIAGNOSIS — F32.A DEPRESSION, UNSPECIFIED DEPRESSION TYPE: ICD-10-CM

## 2018-05-14 DIAGNOSIS — F98.8 ATTENTION DEFICIT DISORDER, UNSPECIFIED HYPERACTIVITY PRESENCE: ICD-10-CM

## 2018-05-14 RX ORDER — ESCITALOPRAM OXALATE 10 MG/1
10 TABLET ORAL DAILY
Qty: 30 TABLET | Refills: 5 | Status: SHIPPED | OUTPATIENT
Start: 2018-05-14 | End: 2019-04-08 | Stop reason: SDUPTHER

## 2018-05-14 RX ORDER — LISDEXAMFETAMINE DIMESYLATE 30 MG/1
30 CAPSULE ORAL EVERY MORNING
Qty: 30 CAPSULE | Refills: 0 | Status: SHIPPED | OUTPATIENT
Start: 2018-05-14 | End: 2018-06-18 | Stop reason: SDUPTHER

## 2018-05-16 ENCOUNTER — TELEPHONE (OUTPATIENT)
Dept: FAMILY MEDICINE | Facility: CLINIC | Age: 53
End: 2018-05-16

## 2018-05-16 DIAGNOSIS — R30.0 DYSURIA: Primary | ICD-10-CM

## 2018-05-16 NOTE — TELEPHONE ENCOUNTER
Pt c/o possible bladder infection, discomfort in her lower abd area, burning urination. Would like to come in to do a UA. Please advise, orders pended.    Will call to make lab appt once signed.

## 2018-06-10 RX ORDER — CONJUGATED ESTROGENS/BAZEDOXIFENE .45; 2 MG/1; MG/1
TABLET, FILM COATED ORAL
Qty: 30 TABLET | Refills: 0 | Status: SHIPPED | OUTPATIENT
Start: 2018-06-10 | End: 2018-08-03

## 2018-06-18 DIAGNOSIS — F98.8 ATTENTION DEFICIT DISORDER, UNSPECIFIED HYPERACTIVITY PRESENCE: ICD-10-CM

## 2018-06-18 RX ORDER — LISDEXAMFETAMINE DIMESYLATE 30 MG/1
30 CAPSULE ORAL EVERY MORNING
Qty: 30 CAPSULE | Refills: 0 | Status: SHIPPED | OUTPATIENT
Start: 2018-06-18 | End: 2018-06-25 | Stop reason: SDUPTHER

## 2018-06-25 ENCOUNTER — OFFICE VISIT (OUTPATIENT)
Dept: FAMILY MEDICINE | Facility: CLINIC | Age: 53
End: 2018-06-25
Payer: COMMERCIAL

## 2018-06-25 VITALS
SYSTOLIC BLOOD PRESSURE: 110 MMHG | DIASTOLIC BLOOD PRESSURE: 78 MMHG | HEIGHT: 62 IN | HEART RATE: 64 BPM | WEIGHT: 155 LBS | BODY MASS INDEX: 28.52 KG/M2 | RESPIRATION RATE: 16 BRPM

## 2018-06-25 DIAGNOSIS — F32.A DEPRESSION, UNSPECIFIED DEPRESSION TYPE: ICD-10-CM

## 2018-06-25 DIAGNOSIS — F90.0 ATTENTION DEFICIT HYPERACTIVITY DISORDER (ADHD), PREDOMINANTLY INATTENTIVE TYPE: Primary | ICD-10-CM

## 2018-06-25 PROCEDURE — 3008F BODY MASS INDEX DOCD: CPT | Mod: CPTII,S$GLB,, | Performed by: FAMILY MEDICINE

## 2018-06-25 PROCEDURE — 99213 OFFICE O/P EST LOW 20 MIN: CPT | Mod: S$GLB,,, | Performed by: FAMILY MEDICINE

## 2018-06-25 PROCEDURE — 99999 PR PBB SHADOW E&M-EST. PATIENT-LVL III: CPT | Mod: PBBFAC,,, | Performed by: FAMILY MEDICINE

## 2018-06-25 RX ORDER — LISDEXAMFETAMINE DIMESYLATE 50 MG/1
50 CAPSULE ORAL EVERY MORNING
Qty: 30 CAPSULE | Refills: 0 | Status: SHIPPED | OUTPATIENT
Start: 2018-08-23 | End: 2018-08-03 | Stop reason: SDUPTHER

## 2018-06-25 RX ORDER — LISDEXAMFETAMINE DIMESYLATE 50 MG/1
50 CAPSULE ORAL EVERY MORNING
Qty: 30 CAPSULE | Refills: 0 | Status: SHIPPED | OUTPATIENT
Start: 2018-07-24 | End: 2019-04-08 | Stop reason: SDUPTHER

## 2018-06-25 RX ORDER — LISDEXAMFETAMINE DIMESYLATE 50 MG/1
50 CAPSULE ORAL EVERY MORNING
Qty: 30 CAPSULE | Refills: 0 | Status: SHIPPED | OUTPATIENT
Start: 2018-06-25 | End: 2018-08-03 | Stop reason: SDUPTHER

## 2018-06-25 NOTE — PROGRESS NOTES
Pt is a 52 y.o. female who presents for check up for   Encounter Diagnoses   Name Primary?    Attention deficit hyperactivity disorder (ADHD), predominantly inattentive type Yes    Depression, unspecified depression type     Attention deficit disorder, unspecified hyperactivity presence    . Doing well on current meds. Denies any side effects. Prevention is up to date.    Chief complaint: Follow up for ADD    History of present illness: Renetta East is a 52 y.o. female here for followup on Vyvanse 30 mg once daily.  The patient is able to pay attention and focus on tasks.  She is very sleepy during the day.  Driving causes severe sleepiness.  She would like to increase the dose.  The patient denies side effects such as abdominal pain, headaches, insomnia, and poor appetite.  The patient is able to focus without getting distracted.  Work performance is stable.  The work is helping the patient with modifications.  Staff is not complaining.  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  Patient is doing better on Lexapro 10 mg daily.  Her mood is stable.  She notices a difference if she forgets to take it for 2 or 3 days.  Patient has dyslipidemia.  She is not taking her statin at this time.     Review of systems:  Gen.: No weight loss  HEENT: No headaches, sinus congestion, change in vision  Cardiovascular: No chest pain, palpitations  Respiratory: No cough, shortness of breath  Neurological: Sleeping well, no weakness, no syncope, normal memory, no seizure, no ticks    Physical exam:   Vitals:    06/25/18 0909   BP: 110/78   Pulse: 64   Resp: 16     Gen.: Well developed, well nourished white female in no apparent distress  HEENT: Pupils equal round reactive to light and accommodation, extraocular muscles intact, TMs are normal translucent mobile, neck is supple no lymphadenopathy no JVD, throat is clear.  Heart:Cardiac exam revealed the PMI to  be normally situated and sized. The rhythm was regular and no extrasystoles were noted during several minutes of auscultation. The first and second heart sounds were normal and physiologic splitting of the second heart sound was noted. There were no murmurs, rubs, clicks, or gallops.  Lungs:Lungs were clear to auscultation and percussion, and with normal diaphragmatic excursion. No wheezes or rales were noted.   Neuro: Neurologically, the patient was awake, alert, and oriented to person, place and time. There were no obvious focal neurologic abnormalities.  Face: Erythematous rash in the nasal fold.  This appears to be rosacea versus staph.    Lab Results   Component Value Date    LDLCALC 171.6 (H) 05/30/2017     Assessment/Plan:    Attention deficit hyperactivity disorder - stable on current medication  The current medication Vyvanse 30 mg will be continued.  A prescription for 30 tablets, 3 prescriptions, was given.  I will continue to monitor her symptoms and adjust accordingly.    15 minute discussion with the family regarding the importance of proper sleep hygiene was completed.  The child will be encouraged to go to bed at the same time and wake up at the same time even on the weekends.  The family was encouraged to continue giving the medication even on weekends and holidays.    Mixed hyperlipidemia  Low fat diet.  Avoid sweets.  A 10 pound weight loss by the next visit as a  good goal.  Increase consumption of fruits and vegetables, fish and chicken.  Use medications below:  She is currently not taking Lipitor at this time.  Repeat lipid panel at her next visit.  Recommend fish oil    ADD (attention deficit disorder)  -     lisdexamfetamine (VYVANSE) 50 MG capsule; Take 1 capsule (20 mg total) by mouth every morning.  -     lisdexamfetamine (VYVANSE) 50 MG capsule; Take 1 capsule (20 mg total) by mouth every morning.  -     lisdexamfetamine (VYVANSE) 50 MG capsule; Take 1 capsule (20 mg total) by mouth every  morning.    Calculus of gallbladder without cholecystitis without obstruction  No treatment necessary at this time    Attention deficit hyperactivity disorder (ADHD), predominantly inattentive type    Depression, unspecified depression type    Attention deficit disorder, unspecified hyperactivity presence  -     lisdexamfetamine (VYVANSE) 50 MG capsule; Take 1 capsule (50 mg total) by mouth every morning.  Dispense: 30 capsule; Refill: 0  -     lisdexamfetamine (VYVANSE) 50 MG capsule; Take 1 capsule (50 mg total) by mouth every morning.  Dispense: 30 capsule; Refill: 0  -     lisdexamfetamine (VYVANSE) 50 MG capsule; Take 1 capsule (50 mg total) by mouth every morning.  Dispense: 30 capsule; Refill: 0    The next appointment will be 3 months.

## 2018-06-26 NOTE — PROGRESS NOTES
Tell patient that labs show high cholesterol.  She should start lipitor.  Ask her if she is willing to do this

## 2018-08-03 ENCOUNTER — OFFICE VISIT (OUTPATIENT)
Dept: OBSTETRICS AND GYNECOLOGY | Facility: CLINIC | Age: 53
End: 2018-08-03
Payer: COMMERCIAL

## 2018-08-03 VITALS
HEART RATE: 82 BPM | DIASTOLIC BLOOD PRESSURE: 80 MMHG | RESPIRATION RATE: 18 BRPM | SYSTOLIC BLOOD PRESSURE: 120 MMHG | HEIGHT: 62 IN | WEIGHT: 152 LBS | BODY MASS INDEX: 27.97 KG/M2

## 2018-08-03 DIAGNOSIS — Z12.31 ENCOUNTER FOR SCREENING MAMMOGRAM FOR BREAST CANCER: ICD-10-CM

## 2018-08-03 DIAGNOSIS — N95.2 ATROPHIC VAGINITIS: ICD-10-CM

## 2018-08-03 DIAGNOSIS — Z01.419 WELL WOMAN EXAM WITH ROUTINE GYNECOLOGICAL EXAM: Primary | ICD-10-CM

## 2018-08-03 DIAGNOSIS — M81.0 POSTMENOPAUSAL BONE LOSS: ICD-10-CM

## 2018-08-03 DIAGNOSIS — Z12.4 CERVICAL CANCER SCREENING: ICD-10-CM

## 2018-08-03 PROCEDURE — 88175 CYTOPATH C/V AUTO FLUID REDO: CPT

## 2018-08-03 PROCEDURE — 99999 PR PBB SHADOW E&M-EST. PATIENT-LVL III: CPT | Mod: PBBFAC,,, | Performed by: OBSTETRICS & GYNECOLOGY

## 2018-08-03 PROCEDURE — 99396 PREV VISIT EST AGE 40-64: CPT | Mod: S$GLB,,, | Performed by: OBSTETRICS & GYNECOLOGY

## 2018-08-03 RX ORDER — ESTRADIOL 0.1 MG/G
1 CREAM VAGINAL
Qty: 42.5 G | Refills: 4 | Status: SHIPPED | OUTPATIENT
Start: 2018-08-06 | End: 2023-04-04

## 2018-08-03 NOTE — PROGRESS NOTES
Subjective:    Patient ID: Renetta East is a 52 y.o. female.     Chief Complaint: Annual Well Woman Exam     History of Present Illness:  Renetta presents today for Annual Well Woman exam. .No LMP recorded. Patient is premenopausal.. She is currently using no hormone replacement therapy and she reports no problems with hot flashes, night sweats, irritability and vaginal dryness. She denies breast tenderness, masses, nipple discharge.  She reports no problems with urination. Bowel movements have not significantly changed.    Menstrual History:   No LMP recorded. Patient is premenopausal..     OB History      Para Term  AB Living    1 1       1    SAB TAB Ectopic Multiple Live Births                       Review of Systems   Constitutional: Positive for unexpected weight change. Negative for activity change, appetite change, chills, diaphoresis, fatigue and fever.   HENT: Negative for mouth sores and tinnitus.    Eyes: Positive for visual disturbance. Negative for discharge.   Respiratory: Negative for cough, shortness of breath and wheezing.    Cardiovascular: Negative for chest pain, palpitations and leg swelling.   Gastrointestinal: Positive for constipation. Negative for abdominal pain, blood in stool, diarrhea, nausea and vomiting.   Endocrine: Positive for hair loss. Negative for diabetes, hot flashes, hyperthyroidism and hypothyroidism.   Genitourinary: Positive for decreased libido, dyspareunia, frequency and urgency. Negative for dysuria, flank pain, genital sores, hematuria, menorrhagia, menstrual problem, pelvic pain, vaginal bleeding, vaginal discharge, vaginal pain, urinary incontinence, postcoital bleeding and vaginal odor.   Musculoskeletal: Negative for back pain, joint swelling and myalgias.   Skin:  Negative for rash, no acne and hair changes.   Neurological: Negative for seizures, syncope, numbness and headaches.   Hematological: Negative for adenopathy. Does not bruise/bleed easily.    Psychiatric/Behavioral: Positive for depression. Negative for sleep disturbance. The patient is not nervous/anxious.    Breast: Negative for breast pain and nipple discharge        Objective:    Vital Signs:  Vitals:    08/03/18 1150   BP: 120/80   Pulse: 82   Resp: 18       Physical Exam:  General:  alert,normal appearing gravid female   Skin:  Skin color, texture, turgor normal. No rashes or lesions   HEENT:  conjunctivae/corneas clear. PERRL.   Neck: supple, trachea midline, no adenopathy or thyromegally   Respiratory:  clear to auscultation bilaterally   Heart:  regular rate and rhythm, S1, S2 normal, no murmur, click, rub or gallop   Breasts:   Nipples are protruding and have no nipple discharge. No palpable masses, erythema, skin changes, tenderness, or adenopathy.   Abdomen:  soft, non-tender. Bowel sounds normal. No masses,  no organomegaly   Pelvis: External genitalia: normal general appearance  Urinary system: urethral meatus normal, bladder nontender  Vaginal: normal mucosa without prolapse or lesions  Cervix: normal appearance  Uterus: normal single, nontender  Adnexa: normal bimanual exam   Extremities: Normal ROM; no edema, no cyanosis   Neurologial: Normal strength and tone. No focal numbness or weakness. Reflexes 2+ and equal.   Psychiatric: normal mood, speech, dress, and thought processes         Assessment:      1. Well woman exam with routine gynecological exam    2. Cervical cancer screening    3. Postmenopausal bone loss    4. Encounter for screening mammogram for breast cancer    5. Atrophic vaginitis          Plan:      Well woman exam with routine gynecological exam    Cervical cancer screening  -     Liquid-based pap smear, screening    Postmenopausal bone loss  -     DXA Bone Density Spine And Hip; Future; Expected date: 08/03/2018    Encounter for screening mammogram for breast cancer  -     Mammo Digital Screening Bilat with CAD; Future; Expected date: 08/03/2018    Atrophic  vaginitis  -     ESTRACE 0.01 % (0.1 mg/gram) vaginal cream; Place 1 g vaginally twice a week.  Dispense: 42.5 g; Refill: 4        COUNSELING:  Renetta was counseled on A.C.O.G. Pap guidelines and recommendations for yearly pelvic exams in addition to recommendations for yearly mammograms and monthly self breast exams. In addition she was counseled on adequate intake of calcium and vitamin D; to see her PCP for other health maintenance.

## 2018-08-10 DIAGNOSIS — F90.0 ATTENTION DEFICIT HYPERACTIVITY DISORDER (ADHD), PREDOMINANTLY INATTENTIVE TYPE: ICD-10-CM

## 2018-08-10 DIAGNOSIS — Z11.59 NEED FOR HEPATITIS C SCREENING TEST: ICD-10-CM

## 2018-08-10 RX ORDER — LISDEXAMFETAMINE DIMESYLATE 50 MG/1
50 CAPSULE ORAL EVERY MORNING
Qty: 30 CAPSULE | Refills: 0 | Status: CANCELLED | OUTPATIENT
Start: 2018-08-10

## 2018-08-13 ENCOUNTER — HOSPITAL ENCOUNTER (OUTPATIENT)
Dept: RADIOLOGY | Facility: HOSPITAL | Age: 53
Discharge: HOME | End: 2018-08-13
Attending: OBSTETRICS & GYNECOLOGY
Payer: COMMERCIAL

## 2018-08-13 VITALS — WEIGHT: 152 LBS | BODY MASS INDEX: 27.97 KG/M2 | HEIGHT: 62 IN

## 2018-08-13 DIAGNOSIS — Z12.31 ENCOUNTER FOR SCREENING MAMMOGRAM FOR BREAST CANCER: ICD-10-CM

## 2018-08-13 DIAGNOSIS — M81.0 POSTMENOPAUSAL BONE LOSS: ICD-10-CM

## 2018-08-13 PROCEDURE — 77080 DXA BONE DENSITY AXIAL: CPT | Mod: TC

## 2018-12-24 ENCOUNTER — OFFICE VISIT (OUTPATIENT)
Dept: URGENT CARE | Facility: CLINIC | Age: 53
End: 2018-12-24
Payer: COMMERCIAL

## 2018-12-24 VITALS
BODY MASS INDEX: 27.97 KG/M2 | OXYGEN SATURATION: 99 % | HEIGHT: 62 IN | TEMPERATURE: 98 F | SYSTOLIC BLOOD PRESSURE: 116 MMHG | HEART RATE: 70 BPM | WEIGHT: 152 LBS | DIASTOLIC BLOOD PRESSURE: 79 MMHG

## 2018-12-24 DIAGNOSIS — T23.102A SUPERFICIAL BURN OF LEFT HAND INCLUDING FINGERS, INITIAL ENCOUNTER: Primary | ICD-10-CM

## 2018-12-24 DIAGNOSIS — T23.132A SUPERFICIAL BURN OF LEFT HAND INCLUDING FINGERS, INITIAL ENCOUNTER: Primary | ICD-10-CM

## 2018-12-24 PROCEDURE — 3008F BODY MASS INDEX DOCD: CPT | Mod: CPTII,S$GLB,, | Performed by: NURSE PRACTITIONER

## 2018-12-24 PROCEDURE — 99213 OFFICE O/P EST LOW 20 MIN: CPT | Mod: S$GLB,,, | Performed by: NURSE PRACTITIONER

## 2018-12-24 RX ORDER — TRAMADOL HYDROCHLORIDE 50 MG/1
50 TABLET ORAL EVERY 8 HOURS PRN
Qty: 20 TABLET | Refills: 0 | Status: SHIPPED | OUTPATIENT
Start: 2018-12-24 | End: 2018-12-31

## 2018-12-24 RX ORDER — SILVER SULFADIAZINE 10 G/1000G
CREAM TOPICAL
Qty: 50 G | Refills: 0 | Status: SHIPPED | OUTPATIENT
Start: 2018-12-24 | End: 2019-05-15
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
## 2018-12-24 NOTE — PATIENT INSTRUCTIONS
First-Degree Burn  A burn occurs when skin is exposed to too much heat, sun, or harsh chemicals. A first-degree burn (superficial burn) causes mainly redness. It heals in a few days.  Home care  Follow these guidelines when caring for yourself at home:  · Use pain medicine as directed. You may use over-the-counter medicine to control pain if no pain medicine was prescribed. If you have chronic liver or kidney disease, talk with your healthcare provider before using acetaminophen or ibuprofen. Also talk with your provider if you've had a stomach ulcer or GI bleeding.  · On the first day, you may put a cool compress on the burn to relieve pain. You can use a small towel soaked in cool water as a cool compress.  · Numbing medicines for sunburn can be used for temporary relief of the burning feeling. These medicines include lidocaine or benzocaine. You dont need a prescription for them.  · Moisturizers with aloe vera can help soothe the burn.  · Don't pick or scratch at the affected areas. Use over-the-counter medicines like diphenhydramine for itching. This medicine is taken by mouth.  · Since you don't have an open wound, you don't need to use antibiotic cream or ointment. Sometimes an infection may occur even with proper treatment. Be sure to check the burn daily for the signs of infection listed below.  · Wear a hat, sunscreen, and long sleeves while in the sun.  · Wear loose-fitting clothing until the burn heals.  Follow-up care  Follow up with your healthcare provider, or as advised. Most first-degree burns heal well without complications.  When to seek medical advice  Call your healthcare provider right away if any of these signs of infection occur:  · Fever of 100.4°F (38°C) or higher, or as directed by your healthcare provider  · Pain gets worse  · Redness or swelling gets worse  · Pus comes from the burn  · Red streaks in your skin come from the burn  · Wound doesn't appear to be healing  Date Last  Reviewed: 12/1/2016  © 2141-6962 [employer]. [address] [place] [address], [place] [address]. All rights reserved. This information is not intended as a substitute for professional medical care. Always follow your healthcare professional's instructions.

## 2019-03-29 ENCOUNTER — OFFICE VISIT (OUTPATIENT)
Dept: FAMILY MEDICINE | Facility: CLINIC | Age: 54
End: 2019-03-29
Payer: COMMERCIAL

## 2019-03-29 VITALS
BODY MASS INDEX: 28.96 KG/M2 | HEIGHT: 62 IN | DIASTOLIC BLOOD PRESSURE: 70 MMHG | HEART RATE: 76 BPM | WEIGHT: 157.38 LBS | SYSTOLIC BLOOD PRESSURE: 130 MMHG | RESPIRATION RATE: 16 BRPM

## 2019-03-29 DIAGNOSIS — H90.12 CONDUCTIVE HEARING LOSS OF LEFT EAR, UNSPECIFIED HEARING STATUS ON CONTRALATERAL SIDE: ICD-10-CM

## 2019-03-29 DIAGNOSIS — H65.02 ACUTE SEROUS OTITIS MEDIA OF LEFT EAR, RECURRENCE NOT SPECIFIED: Primary | ICD-10-CM

## 2019-03-29 PROCEDURE — 99213 PR OFFICE/OUTPT VISIT, EST, LEVL III, 20-29 MIN: ICD-10-PCS | Mod: 25,S$GLB,, | Performed by: FAMILY MEDICINE

## 2019-03-29 PROCEDURE — 99999 PR PBB SHADOW E&M-EST. PATIENT-LVL III: CPT | Mod: PBBFAC,,, | Performed by: FAMILY MEDICINE

## 2019-03-29 PROCEDURE — 3008F PR BODY MASS INDEX (BMI) DOCUMENTED: ICD-10-PCS | Mod: CPTII,S$GLB,, | Performed by: FAMILY MEDICINE

## 2019-03-29 PROCEDURE — 96372 THER/PROPH/DIAG INJ SC/IM: CPT | Mod: S$GLB,,, | Performed by: FAMILY MEDICINE

## 2019-03-29 PROCEDURE — 99999 PR PBB SHADOW E&M-EST. PATIENT-LVL III: ICD-10-PCS | Mod: PBBFAC,,, | Performed by: FAMILY MEDICINE

## 2019-03-29 PROCEDURE — 96372 PR INJECTION,THERAP/PROPH/DIAG2ST, IM OR SUBCUT: ICD-10-PCS | Mod: S$GLB,,, | Performed by: FAMILY MEDICINE

## 2019-03-29 PROCEDURE — 3008F BODY MASS INDEX DOCD: CPT | Mod: CPTII,S$GLB,, | Performed by: FAMILY MEDICINE

## 2019-03-29 PROCEDURE — 99213 OFFICE O/P EST LOW 20 MIN: CPT | Mod: 25,S$GLB,, | Performed by: FAMILY MEDICINE

## 2019-03-29 RX ORDER — METHYLPREDNISOLONE ACETATE 40 MG/ML
60 INJECTION, SUSPENSION INTRA-ARTICULAR; INTRALESIONAL; INTRAMUSCULAR; SOFT TISSUE
Status: COMPLETED | OUTPATIENT
Start: 2019-03-29 | End: 2019-03-29

## 2019-03-29 RX ORDER — CETIRIZINE HYDROCHLORIDE 10 MG/1
10 TABLET ORAL DAILY
Qty: 30 TABLET | Refills: 5 | Status: SHIPPED | OUTPATIENT
Start: 2019-03-29 | End: 2023-04-04

## 2019-03-29 RX ORDER — AZITHROMYCIN 250 MG/1
TABLET, FILM COATED ORAL
Qty: 6 TABLET | Refills: 0 | Status: SHIPPED | OUTPATIENT
Start: 2019-03-29 | End: 2019-05-15

## 2019-03-29 RX ORDER — VALACYCLOVIR HYDROCHLORIDE 1 G/1
1000 TABLET, FILM COATED ORAL 3 TIMES DAILY
Qty: 21 TABLET | Refills: 0 | Status: SHIPPED | OUTPATIENT
Start: 2019-03-29 | End: 2019-05-15

## 2019-03-29 RX ADMIN — METHYLPREDNISOLONE ACETATE 60 MG: 40 INJECTION, SUSPENSION INTRA-ARTICULAR; INTRALESIONAL; INTRAMUSCULAR; SOFT TISSUE at 03:03

## 2019-03-29 NOTE — PROGRESS NOTES
Subjective:       Patient ID: Renetta East is a 53 y.o. female.    Chief Complaint: Otalgia (left ear )    Sudden hearing loss in left ear    Review of Systems   Constitutional: Negative for activity change, chills, fatigue, fever and unexpected weight change.   HENT: Positive for hearing loss. Negative for facial swelling, sore throat and trouble swallowing.    Respiratory: Negative for cough, chest tightness and shortness of breath.    Cardiovascular: Negative for chest pain and leg swelling.   Gastrointestinal: Negative for abdominal pain.   Endocrine: Negative for cold intolerance and heat intolerance.   Genitourinary: Negative for difficulty urinating.   Musculoskeletal: Negative for back pain and joint swelling.   Skin: Negative for rash.   Neurological: Negative for numbness.   Hematological: Negative for adenopathy.   Psychiatric/Behavioral: Negative for decreased concentration.       Objective:      Vitals:    03/29/19 1456   BP: 130/70   Pulse: 76   Resp: 16     Physical Exam   Constitutional: She is oriented to person, place, and time. She appears well-developed and well-nourished.   HENT:   Head: Normocephalic and atraumatic.   Right Ear: Tympanic membrane and ear canal normal.   Left Ear: Ear canal normal. A middle ear effusion is present.   Ears:    Mouth/Throat: Oropharynx is clear and moist.   Eyes: Pupils are equal, round, and reactive to light. EOM are normal.   Neck: Normal range of motion. Neck supple. No JVD present. No thyromegaly present.   Cardiovascular: Normal rate, regular rhythm, normal heart sounds and intact distal pulses. Exam reveals no gallop and no friction rub.   No murmur heard.  Pulmonary/Chest: Effort normal and breath sounds normal.   Abdominal: Soft. Bowel sounds are normal.   Neurological: She is alert and oriented to person, place, and time. No cranial nerve deficit.   Skin: Skin is warm.   Psychiatric: She has a normal mood and affect. Her behavior is normal. Judgment and  thought content normal.       Assessment:       1. Acute serous otitis media of left ear, recurrence not specified    2. Conductive hearing loss of left ear, unspecified hearing status on contralateral side        Plan:   Renetta was seen today for otalgia.    Diagnoses and all orders for this visit:    Acute serous otitis media of left ear, recurrence not specified  -     methylPREDNISolone acetate injection 60 mg  -     azithromycin (Z-SAM) 250 MG tablet; 2 po day 1, then 1 po q day  -     cetirizine (ZYRTEC) 10 MG tablet; Take 1 tablet (10 mg total) by mouth once daily.    Conductive hearing loss of left ear, unspecified hearing status on contralateral side  -     methylPREDNISolone acetate injection 60 mg  -     valACYclovir (VALTREX) 1000 MG tablet; Take 1 tablet (1,000 mg total) by mouth 3 (three) times daily. for 7 days      rtc in not improving

## 2019-04-08 DIAGNOSIS — F90.0 ATTENTION DEFICIT HYPERACTIVITY DISORDER (ADHD), PREDOMINANTLY INATTENTIVE TYPE: ICD-10-CM

## 2019-04-08 DIAGNOSIS — F32.A DEPRESSION, UNSPECIFIED DEPRESSION TYPE: ICD-10-CM

## 2019-04-08 RX ORDER — ESCITALOPRAM OXALATE 10 MG/1
10 TABLET ORAL DAILY
Qty: 30 TABLET | Refills: 5 | Status: SHIPPED | OUTPATIENT
Start: 2019-04-08 | End: 2020-04-13

## 2019-04-08 RX ORDER — LISDEXAMFETAMINE DIMESYLATE 50 MG/1
50 CAPSULE ORAL EVERY MORNING
Qty: 30 CAPSULE | Refills: 0 | Status: SHIPPED | OUTPATIENT
Start: 2019-04-08 | End: 2019-05-14 | Stop reason: SDUPTHER

## 2019-04-08 NOTE — TELEPHONE ENCOUNTER
----- Message from Clyde Gomez sent at 2019  8:39 AM CDT -----  Contact: Patient  Renetta East  MRN: [id number]  : 1965  PCP: Nathen Arreola  Home Phone      [phone]  Work Phone      Not on file.  Mobile          [phone]      MESSAGE: seen last week -- said she would call if meds were needed -- requesting refills on Vyvanse & Lexapro -- send to [facility] in [place]    Call [phone]    PCP: Elba

## 2019-04-09 ENCOUNTER — TELEPHONE (OUTPATIENT)
Dept: FAMILY MEDICINE | Facility: CLINIC | Age: 54
End: 2019-04-09

## 2019-04-09 NOTE — TELEPHONE ENCOUNTER
----- Message from Viktoria Jang sent at 4/9/2019  9:26 AM CDT -----  Received Rx for the patient and insurance is stating that the medication isnt covered for patient under 18, and a PA id required. We can do the PA for the patient or does the medication needs to be changed

## 2019-04-16 ENCOUNTER — TELEPHONE (OUTPATIENT)
Dept: PHARMACY | Facility: CLINIC | Age: 54
End: 2019-04-16

## 2019-05-14 ENCOUNTER — TELEPHONE (OUTPATIENT)
Dept: FAMILY MEDICINE | Facility: CLINIC | Age: 54
End: 2019-05-14

## 2019-05-14 DIAGNOSIS — F90.0 ATTENTION DEFICIT HYPERACTIVITY DISORDER (ADHD), PREDOMINANTLY INATTENTIVE TYPE: ICD-10-CM

## 2019-05-14 RX ORDER — LISDEXAMFETAMINE DIMESYLATE 50 MG/1
50 CAPSULE ORAL EVERY MORNING
Qty: 30 CAPSULE | Refills: 0 | Status: SHIPPED | OUTPATIENT
Start: 2019-05-14 | End: 2019-06-24 | Stop reason: SDUPTHER

## 2019-05-14 NOTE — TELEPHONE ENCOUNTER
----- Message from Fani Saenz sent at 2019  8:19 AM CDT -----  Contact: pt  Renetta East  MRN: [id number]  : 1965  PCP: Nathen Arreola  Home Phone      [phone]  Work Phone      Not on file.  Mobile          [phone]      MESSAGE:   Pt requesting refill or new Rx.   Is this a refill or new RX:  refill  RX name and strength: lisdexamfetamine (VYVANSE) 50 MG capsule  Last office visit: 3/29/2019  Is this a 30-day or 90-day RX:  30  Pharmacy name and location:  Chanda vasquez [facility] in [place] (if we can send it electronically - if we can not send it electronically then she can come pick it up)  Comments:  She is not out of medication right now, but the medication needs a PA & the patient stated that last time she got it refilled the PA took three weeks.     Phone:  [phone]

## 2019-05-15 DIAGNOSIS — F90.0 ATTENTION DEFICIT HYPERACTIVITY DISORDER (ADHD), PREDOMINANTLY INATTENTIVE TYPE: ICD-10-CM

## 2019-05-15 RX ORDER — LISDEXAMFETAMINE DIMESYLATE 50 MG/1
50 CAPSULE ORAL EVERY MORNING
Qty: 30 CAPSULE | Refills: 0 | Status: CANCELLED | OUTPATIENT
Start: 2019-05-15

## 2019-06-24 DIAGNOSIS — F90.0 ATTENTION DEFICIT HYPERACTIVITY DISORDER (ADHD), PREDOMINANTLY INATTENTIVE TYPE: ICD-10-CM

## 2019-06-24 RX ORDER — LISDEXAMFETAMINE DIMESYLATE 50 MG/1
50 CAPSULE ORAL EVERY MORNING
Qty: 30 CAPSULE | Refills: 0 | Status: SHIPPED | OUTPATIENT
Start: 2019-06-24 | End: 2019-08-09 | Stop reason: SDUPTHER

## 2019-08-07 ENCOUNTER — PATIENT MESSAGE (OUTPATIENT)
Dept: INTERNAL MEDICINE | Facility: CLINIC | Age: 54
End: 2019-08-07

## 2019-08-09 DIAGNOSIS — F90.0 ATTENTION DEFICIT HYPERACTIVITY DISORDER (ADHD), PREDOMINANTLY INATTENTIVE TYPE: ICD-10-CM

## 2019-08-10 RX ORDER — LISDEXAMFETAMINE DIMESYLATE 50 MG/1
50 CAPSULE ORAL EVERY MORNING
Qty: 30 CAPSULE | Refills: 0 | Status: SHIPPED | OUTPATIENT
Start: 2019-08-10 | End: 2019-09-11 | Stop reason: SDUPTHER

## 2019-09-11 DIAGNOSIS — F90.0 ATTENTION DEFICIT HYPERACTIVITY DISORDER (ADHD), PREDOMINANTLY INATTENTIVE TYPE: ICD-10-CM

## 2019-09-11 RX ORDER — LISDEXAMFETAMINE DIMESYLATE 50 MG/1
50 CAPSULE ORAL EVERY MORNING
Qty: 30 CAPSULE | Refills: 0 | Status: SHIPPED | OUTPATIENT
Start: 2019-09-11 | End: 2023-04-04

## 2019-10-04 ENCOUNTER — PATIENT OUTREACH (OUTPATIENT)
Dept: ADMINISTRATIVE | Facility: HOSPITAL | Age: 54
End: 2019-10-04

## 2019-12-11 ENCOUNTER — TELEPHONE (OUTPATIENT)
Dept: FAMILY MEDICINE | Facility: CLINIC | Age: 54
End: 2019-12-11

## 2019-12-11 NOTE — TELEPHONE ENCOUNTER
Spoke with pt and she states that she had a MMG done in [place] - Cannot currently remember Dr's name - Will call me back with it.

## 2020-04-10 DIAGNOSIS — F32.A DEPRESSION, UNSPECIFIED DEPRESSION TYPE: ICD-10-CM

## 2020-04-13 RX ORDER — ESCITALOPRAM OXALATE 10 MG/1
TABLET ORAL
Qty: 30 TABLET | Refills: 5 | Status: SHIPPED | OUTPATIENT
Start: 2020-04-13

## 2020-08-21 DIAGNOSIS — Z11.59 NEED FOR HEPATITIS C SCREENING TEST: ICD-10-CM

## 2020-10-23 LAB
CHOLEST/HDLC SERPL: 4.1 {RATIO}
CHOLESTEROL, TOTAL: 249
HDLC SERPL-MCNC: 61 MG/DL (ref 35–70)
LDL CHOLESTEROL DIRECT: 146 MG/DL
TRIGL SERPL-MCNC: 213 MG/DL
TRIGL SERPL-MCNC: 213 MG/DL
VLDL CHOLESTEROL: 43 MG/DL

## 2020-11-03 ENCOUNTER — PATIENT OUTREACH (OUTPATIENT)
Dept: ADMINISTRATIVE | Facility: HOSPITAL | Age: 55
End: 2020-11-03

## 2020-11-09 ENCOUNTER — PATIENT OUTREACH (OUTPATIENT)
Dept: ADMINISTRATIVE | Facility: HOSPITAL | Age: 55
End: 2020-11-09

## 2020-12-24 ENCOUNTER — CLINICAL SUPPORT (OUTPATIENT)
Dept: URGENT CARE | Facility: CLINIC | Age: 55
End: 2020-12-24
Payer: COMMERCIAL

## 2020-12-24 DIAGNOSIS — Z11.59 ENCOUNTER FOR SCREENING FOR OTHER VIRAL DISEASES: Primary | ICD-10-CM

## 2020-12-24 LAB
CTP QC/QA: YES
SARS-COV-2 RDRP RESP QL NAA+PROBE: NEGATIVE

## 2020-12-24 PROCEDURE — U0002 COVID-19 LAB TEST NON-CDC: HCPCS | Mod: QW,S$GLB,, | Performed by: NURSE PRACTITIONER

## 2020-12-24 PROCEDURE — U0002: ICD-10-PCS | Mod: QW,S$GLB,, | Performed by: NURSE PRACTITIONER

## 2020-12-24 NOTE — PATIENT INSTRUCTIONS
Your test was NEGATIVE for COVID-19 (coronavirus).      You may leave home and/or return to work when the following conditions are met:  · 24 hours fever free without fever-reducing medications AND  · Improved symptoms  · You have not met the conditions of a close contact     What counts as a close contact?  · You were within 6 feet of someone who has COVID-19 for a total of 15 minutes or more (masked or unmasked).  · You provided care at home to someone who is sick with COVID-19.  · You had direct physical contact with the person (hugged or kissed them).  · You shared eating or drinking utensils.  · They sneezed, coughed, or somehow got respiratory droplets on you.     If you had a close contact:  · If possible, it is recommended that you quarantine for 14 days from the time of contact regardless of your test status.  · If you have no symptoms, quarantine may be stopped early at 10 days, but this carries a small risk of spreading the virus.  · If you have no symptoms and you have a negative COVID test on day 5 or later, quarantine may be stopped after day 7, but this also carries a small risk of spreading the virus.     Additional instructions:  · Social distance per your local guidelines  · Call ahead before visiting your doctor.  · Wear a mask when around others who do not live in your household.  · Cover your coughs and sneezes.  · Wash hands or use hand  often.      If your symptoms worsen or if you have any other concerns, please contact [facility] at [phone].     Sincerely,    Rhea Minor, RT

## 2021-01-04 ENCOUNTER — PATIENT MESSAGE (OUTPATIENT)
Dept: ADMINISTRATIVE | Facility: HOSPITAL | Age: 56
End: 2021-01-04

## 2021-04-05 ENCOUNTER — PATIENT MESSAGE (OUTPATIENT)
Dept: ADMINISTRATIVE | Facility: HOSPITAL | Age: 56
End: 2021-04-05

## 2021-07-06 ENCOUNTER — PATIENT MESSAGE (OUTPATIENT)
Dept: ADMINISTRATIVE | Facility: HOSPITAL | Age: 56
End: 2021-07-06

## 2021-10-17 ENCOUNTER — HOSPITAL ENCOUNTER (EMERGENCY)
Facility: HOSPITAL | Age: 56
Discharge: HOME | End: 2021-10-17
Attending: STUDENT IN AN ORGANIZED HEALTH CARE EDUCATION/TRAINING PROGRAM
Payer: COMMERCIAL

## 2021-10-17 VITALS
HEART RATE: 69 BPM | WEIGHT: 158.06 LBS | OXYGEN SATURATION: 95 % | BODY MASS INDEX: 28.91 KG/M2 | DIASTOLIC BLOOD PRESSURE: 64 MMHG | RESPIRATION RATE: 16 BRPM | SYSTOLIC BLOOD PRESSURE: 124 MMHG | TEMPERATURE: 97 F

## 2021-10-17 DIAGNOSIS — M25.522 ELBOW PAIN, LEFT: ICD-10-CM

## 2021-10-17 DIAGNOSIS — S00.83XA CONTUSION OF FOREHEAD, INITIAL ENCOUNTER: ICD-10-CM
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
## 2021-10-17 DIAGNOSIS — M25.562 LEFT KNEE PAIN: ICD-10-CM

## 2021-10-17 DIAGNOSIS — S09.90XA INJURY OF HEAD, INITIAL ENCOUNTER: ICD-10-CM

## 2021-10-17 PROCEDURE — 25000003 PHARM REV CODE 250: Performed by: NURSE PRACTITIONER

## 2021-10-17 PROCEDURE — 99285 EMERGENCY DEPT VISIT HI MDM: CPT | Mod: 25

## 2021-10-17 RX ORDER — TRAMADOL HYDROCHLORIDE 50 MG/1
50 TABLET ORAL EVERY 6 HOURS PRN
Qty: 12 TABLET | Refills: 0 | Status: SHIPPED | OUTPATIENT
Start: 2021-10-17 | End: 2023-04-04

## 2021-10-17 RX ORDER — TRAMADOL HYDROCHLORIDE 50 MG/1
50 TABLET ORAL
Status: COMPLETED | OUTPATIENT
Start: 2021-10-17 | End: 2021-10-17

## 2021-10-17 RX ADMIN — TRAMADOL HYDROCHLORIDE 50 MG: 50 TABLET ORAL at 07:10

## 2022-02-10 ENCOUNTER — PATIENT MESSAGE (OUTPATIENT)
Dept: ADMINISTRATIVE | Facility: HOSPITAL | Age: 57
End: 2022-02-10
Payer: COMMERCIAL

## 2023-04-04 ENCOUNTER — OFFICE VISIT (OUTPATIENT)
Dept: OBSTETRICS AND GYNECOLOGY | Facility: CLINIC | Age: 58
End: 2023-04-04
Payer: COMMERCIAL

## 2023-04-04 VITALS
SYSTOLIC BLOOD PRESSURE: 108 MMHG | HEART RATE: 82 BPM | DIASTOLIC BLOOD PRESSURE: 64 MMHG | HEIGHT: 62 IN | BODY MASS INDEX: 27.48 KG/M2 | WEIGHT: 149.31 LBS

## 2023-04-04 DIAGNOSIS — Z80.3 FAMILY HISTORY OF BREAST CANCER IN SISTER: ICD-10-CM

## 2023-04-04 DIAGNOSIS — Z12.4 CERVICAL CANCER SCREENING: ICD-10-CM

## 2023-04-04 DIAGNOSIS — Z12.31 ENCOUNTER FOR SCREENING MAMMOGRAM FOR MALIGNANT NEOPLASM OF BREAST: Primary | ICD-10-CM

## 2023-04-04 PROCEDURE — 99396 PR PREVENTIVE VISIT,EST,40-64: ICD-10-PCS | Mod: S$GLB,,, | Performed by: OBSTETRICS & GYNECOLOGY

## 2023-04-04 PROCEDURE — 99396 PREV VISIT EST AGE 40-64: CPT | Mod: S$GLB,,, | Performed by: OBSTETRICS & GYNECOLOGY

## 2023-04-04 PROCEDURE — 88175 CYTOPATH C/V AUTO FLUID REDO: CPT | Performed by: OBSTETRICS & GYNECOLOGY

## 2023-04-04 PROCEDURE — 99999 PR PBB SHADOW E&M-EST. PATIENT-LVL III: ICD-10-PCS | Mod: PBBFAC,,, | Performed by: OBSTETRICS & GYNECOLOGY

## 2023-04-04 PROCEDURE — 99999 PR PBB SHADOW E&M-EST. PATIENT-LVL III: CPT | Mod: PBBFAC,,, | Performed by: OBSTETRICS & GYNECOLOGY

## 2023-04-04 RX ORDER — MODAFINIL 200 MG/1
200 TABLET ORAL 2 TIMES DAILY
COMMUNITY
Start: 2022-11-10

## 2023-04-04 RX ORDER — ROSUVASTATIN CALCIUM 20 MG/1
20 TABLET, COATED ORAL
COMMUNITY
Start: 2023-01-11

## 2023-04-04 RX ORDER — CYANOCOBALAMIN 1000 UG/ML
1000 INJECTION, SOLUTION INTRAMUSCULAR; SUBCUTANEOUS
COMMUNITY
Start: 2023-01-23

## 2023-04-04 NOTE — PROGRESS NOTES
Subjective:       Patient ID: Renetta East is a 57 y.o. female.    Chief Complaint:  Well Woman      History of Present Illness  Patient presents for annual exam.  Patient reports having a mammogram done at [facility] about 6 months ago and it was read as within normal limits.  She is otherwise without gyn complaints.  Patient does report a sister with a history of breast cancer.    Menstrual History:  OB History          1    Para   1    Term   1            AB        Living   1         SAB        IAB        Ectopic        Multiple        Live Births                    Menarche age:  No LMP recorded. Patient is premenopausal.         Review of Systems  Review of Systems   Constitutional:  Negative for activity change, appetite change, chills, diaphoresis, fatigue, fever and unexpected weight change.   HENT:  Negative for congestion, dental problem, drooling, ear discharge, ear pain, facial swelling, hearing loss, mouth sores, nosebleeds, postnasal drip, rhinorrhea, sinus pressure, sneezing, sore throat, tinnitus, trouble swallowing and voice change.    Eyes:  Negative for photophobia, pain, discharge, redness, itching and visual disturbance.   Respiratory:  Negative for apnea, cough, choking, chest tightness, shortness of breath, wheezing and stridor.    Cardiovascular:  Negative for chest pain, palpitations and leg swelling.   Gastrointestinal:  Negative for abdominal distention, abdominal pain, anal bleeding, blood in stool, constipation, diarrhea, nausea, rectal pain and vomiting.   Endocrine: Negative for cold intolerance, heat intolerance, polydipsia, polyphagia and polyuria.   Genitourinary:  Negative for decreased urine volume, difficulty urinating, dyspareunia, dysuria, enuresis, flank pain, frequency, genital sores, hematuria, menstrual problem, pelvic pain, urgency, vaginal bleeding, vaginal discharge and vaginal pain.   Musculoskeletal:  Negative for arthralgias, back pain, gait problem,  joint swelling, myalgias, neck pain and neck stiffness.   Skin:  Negative for color change, pallor, rash and wound.   Allergic/Immunologic: Negative for environmental allergies, food allergies and immunocompromised state.   Neurological:  Negative for dizziness, tremors, seizures, syncope, facial asymmetry, speech difficulty, weakness, light-headedness, numbness and headaches.   Hematological:  Negative for adenopathy. Does not bruise/bleed easily.   Psychiatric/Behavioral:  Negative for agitation, behavioral problems, confusion, decreased concentration, dysphoric mood, hallucinations, self-injury, sleep disturbance and suicidal ideas. The patient is not nervous/anxious and is not hyperactive.          Objective:      Physical Exam  Vitals and nursing note reviewed. Exam conducted with a chaperone present.   Constitutional:       Appearance: She is well-developed.   Neck:      Thyroid: No thyromegaly.   Cardiovascular:      Rate and Rhythm: Normal rate and regular rhythm.   Pulmonary:      Effort: Pulmonary effort is normal.      Breath sounds: Normal breath sounds.   Chest:   Breasts:     Breasts are symmetrical.      Right: No inverted nipple, mass, nipple discharge, skin change or tenderness.      Left: No inverted nipple, mass, nipple discharge, skin change or tenderness.   Abdominal:      General: Bowel sounds are normal.      Palpations: Abdomen is soft. There is no mass.      Tenderness: There is no abdominal tenderness.      Hernia: There is no hernia in the left inguinal area or right inguinal area.   Genitourinary:     General: Normal vulva.      Labia:         Right: No rash, tenderness, lesion or injury.         Left: No rash, tenderness, lesion or injury.       Urethra: No prolapse, urethral pain, urethral swelling or urethral lesion.      Vagina: No signs of injury and foreign body. No vaginal discharge, erythema, tenderness, bleeding, lesions or prolapsed vaginal walls.      Cervix: No cervical motion  tenderness, discharge, friability, lesion, erythema, cervical bleeding or eversion.      Uterus: Not deviated, not enlarged, not fixed, not tender and no uterine prolapse.       Adnexa:         Right: No mass, tenderness or fullness.          Left: No mass, tenderness or fullness.        Rectum: No external hemorrhoid.   Musculoskeletal:         General: Normal range of motion.   Lymphadenopathy:      Lower Body: No right inguinal adenopathy. No left inguinal adenopathy.   Skin:     General: Skin is dry.   Neurological:      Mental Status: She is alert and oriented to person, place, and time.      Deep Tendon Reflexes: Reflexes are normal and symmetric.   Psychiatric:         Behavior: Behavior normal.         Thought Content: Thought content normal.         Judgment: Judgment normal.         Assessment:        1. Cervical cancer screening    2. Encounter for screening mammogram for malignant neoplasm of breast                Plan:         Renetta was seen today for well woman.    Diagnoses and all orders for this visit:    Cervical cancer screening  -     Liquid-Based Pap Smear, Screening    Encounter for screening mammogram for malignant neoplasm of breast
